# Patient Record
Sex: MALE | Race: WHITE | NOT HISPANIC OR LATINO
[De-identification: names, ages, dates, MRNs, and addresses within clinical notes are randomized per-mention and may not be internally consistent; named-entity substitution may affect disease eponyms.]

---

## 2018-02-13 PROBLEM — Z00.00 ENCOUNTER FOR PREVENTIVE HEALTH EXAMINATION: Status: ACTIVE | Noted: 2018-02-13

## 2018-02-13 PROBLEM — M54.2 CERVICAL PAIN: Status: ACTIVE | Noted: 2018-02-13

## 2018-02-13 PROBLEM — M54.5 LUMBAGO: Status: ACTIVE | Noted: 2018-02-13

## 2018-02-15 ENCOUNTER — APPOINTMENT (OUTPATIENT)
Dept: ORTHOPEDIC SURGERY | Facility: CLINIC | Age: 48
End: 2018-02-15
Payer: COMMERCIAL

## 2018-02-15 VITALS
HEIGHT: 73 IN | WEIGHT: 202 LBS | BODY MASS INDEX: 26.77 KG/M2 | DIASTOLIC BLOOD PRESSURE: 78 MMHG | HEART RATE: 65 BPM | SYSTOLIC BLOOD PRESSURE: 120 MMHG

## 2018-02-15 DIAGNOSIS — M54.5 LOW BACK PAIN: ICD-10-CM

## 2018-02-15 DIAGNOSIS — M54.2 CERVICALGIA: ICD-10-CM

## 2018-02-15 DIAGNOSIS — M47.22 OTHER SPONDYLOSIS WITH RADICULOPATHY, CERVICAL REGION: ICD-10-CM

## 2018-02-15 PROCEDURE — 72100 X-RAY EXAM L-S SPINE 2/3 VWS: CPT

## 2018-02-15 PROCEDURE — 72040 X-RAY EXAM NECK SPINE 2-3 VW: CPT

## 2018-02-15 PROCEDURE — 99214 OFFICE O/P EST MOD 30 MIN: CPT

## 2018-02-15 RX ORDER — METHYLPREDNISOLONE 4 MG/1
4 TABLET ORAL
Qty: 1 | Refills: 0 | Status: ACTIVE | COMMUNITY
Start: 2018-02-15 | End: 1900-01-01

## 2022-07-26 ENCOUNTER — OUTPATIENT (OUTPATIENT)
Dept: OUTPATIENT SERVICES | Facility: HOSPITAL | Age: 52
LOS: 1 days | End: 2022-07-26
Payer: COMMERCIAL

## 2022-07-26 VITALS
DIASTOLIC BLOOD PRESSURE: 74 MMHG | OXYGEN SATURATION: 99 % | RESPIRATION RATE: 17 BRPM | HEIGHT: 74 IN | SYSTOLIC BLOOD PRESSURE: 131 MMHG | HEART RATE: 70 BPM | WEIGHT: 186.07 LBS | TEMPERATURE: 98 F

## 2022-07-26 DIAGNOSIS — M75.121 COMPLETE ROTATOR CUFF TEAR OR RUPTURE OF RIGHT SHOULDER, NOT SPECIFIED AS TRAUMATIC: ICD-10-CM

## 2022-07-26 DIAGNOSIS — Z01.818 ENCOUNTER FOR OTHER PREPROCEDURAL EXAMINATION: ICD-10-CM

## 2022-07-26 DIAGNOSIS — M25.511 PAIN IN RIGHT SHOULDER: ICD-10-CM

## 2022-07-26 LAB
A1C WITH ESTIMATED AVERAGE GLUCOSE RESULT: 7.1 % — HIGH (ref 4–5.6)
ALBUMIN SERPL ELPH-MCNC: 3.9 G/DL — SIGNIFICANT CHANGE UP (ref 3.3–5)
ALP SERPL-CCNC: 49 U/L — SIGNIFICANT CHANGE UP (ref 40–120)
ALT FLD-CCNC: 26 U/L — SIGNIFICANT CHANGE UP (ref 12–78)
ANION GAP SERPL CALC-SCNC: 7 MMOL/L — SIGNIFICANT CHANGE UP (ref 5–17)
AST SERPL-CCNC: 18 U/L — SIGNIFICANT CHANGE UP (ref 15–37)
BILIRUB SERPL-MCNC: 0.4 MG/DL — SIGNIFICANT CHANGE UP (ref 0.2–1.2)
BUN SERPL-MCNC: 20 MG/DL — SIGNIFICANT CHANGE UP (ref 7–23)
CALCIUM SERPL-MCNC: 9.1 MG/DL — SIGNIFICANT CHANGE UP (ref 8.5–10.1)
CHLORIDE SERPL-SCNC: 110 MMOL/L — HIGH (ref 96–108)
CO2 SERPL-SCNC: 24 MMOL/L — SIGNIFICANT CHANGE UP (ref 22–31)
CREAT SERPL-MCNC: 0.84 MG/DL — SIGNIFICANT CHANGE UP (ref 0.5–1.3)
EGFR: 105 ML/MIN/1.73M2 — SIGNIFICANT CHANGE UP
ESTIMATED AVERAGE GLUCOSE: 157 MG/DL — HIGH (ref 68–114)
GLUCOSE SERPL-MCNC: 189 MG/DL — HIGH (ref 70–99)
HCT VFR BLD CALC: 43.1 % — SIGNIFICANT CHANGE UP (ref 39–50)
HGB BLD-MCNC: 14.6 G/DL — SIGNIFICANT CHANGE UP (ref 13–17)
MCHC RBC-ENTMCNC: 30.2 PG — SIGNIFICANT CHANGE UP (ref 27–34)
MCHC RBC-ENTMCNC: 33.9 GM/DL — SIGNIFICANT CHANGE UP (ref 32–36)
MCV RBC AUTO: 89 FL — SIGNIFICANT CHANGE UP (ref 80–100)
NRBC # BLD: 0 /100 WBCS — SIGNIFICANT CHANGE UP (ref 0–0)
PLATELET # BLD AUTO: 130 K/UL — LOW (ref 150–400)
POTASSIUM SERPL-MCNC: 3.8 MMOL/L — SIGNIFICANT CHANGE UP (ref 3.5–5.3)
POTASSIUM SERPL-SCNC: 3.8 MMOL/L — SIGNIFICANT CHANGE UP (ref 3.5–5.3)
PROT SERPL-MCNC: 6.6 G/DL — SIGNIFICANT CHANGE UP (ref 6–8.3)
RBC # BLD: 4.84 M/UL — SIGNIFICANT CHANGE UP (ref 4.2–5.8)
RBC # FLD: 13 % — SIGNIFICANT CHANGE UP (ref 10.3–14.5)
SODIUM SERPL-SCNC: 141 MMOL/L — SIGNIFICANT CHANGE UP (ref 135–145)
WBC # BLD: 6.23 K/UL — SIGNIFICANT CHANGE UP (ref 3.8–10.5)
WBC # FLD AUTO: 6.23 K/UL — SIGNIFICANT CHANGE UP (ref 3.8–10.5)

## 2022-07-26 PROCEDURE — 80053 COMPREHEN METABOLIC PANEL: CPT

## 2022-07-26 PROCEDURE — 85027 COMPLETE CBC AUTOMATED: CPT

## 2022-07-26 PROCEDURE — 93010 ELECTROCARDIOGRAM REPORT: CPT

## 2022-07-26 PROCEDURE — 36415 COLL VENOUS BLD VENIPUNCTURE: CPT

## 2022-07-26 PROCEDURE — G0463: CPT

## 2022-07-26 PROCEDURE — 93005 ELECTROCARDIOGRAM TRACING: CPT

## 2022-07-26 PROCEDURE — 83036 HEMOGLOBIN GLYCOSYLATED A1C: CPT

## 2022-07-26 NOTE — H&P PST ADULT - PROBLEM SELECTOR PLAN 1
PST: CBC,CMP,EKG,A1c  All preoperative instructions including CHD cleanse reviewed with patient who verbalized understanding.   Medical evaluation with Dr. Vargas  All preoperative instructions including CHD cleanse reviewed with patient who verbalized understanding.  Avoid all NSAID/ASA containing products as well as all herbal/vitamin supplements. Tylenol only for pain/headache.   Covid swab at [place] date TBD. Pt verbalized understanding.   Fully vaccinated; Denies recent travel, recent illness and sick contact with COVID in the last 3 weeks A1c pending. Would need endo clearance if a1c comes back 9 or above. All Diabetes medication reviewed with patient. Fingerstick am of surgery. Verbalized understanding

## 2022-07-26 NOTE — H&P PST ADULT - NSANTHOSAYNRD_GEN_A_CORE
No. LC screening performed.  STOP BANG Legend: 0-2 = LOW Risk; 3-4 = INTERMEDIATE Risk; 5-8 = HIGH Risk

## 2022-07-26 NOTE — H&P PST ADULT - ASSESSMENT
This 53 yo f with a PMHX of DM,  presents to PST for a scheduled right shoulder arthroscopy rotator cuff repair  with Dr Wade on  8/5/22    This 53 yo m with a PMHX of DM,  presents to PST for a scheduled right shoulder arthroscopy rotator cuff repair  with Dr Wade on  8/5/22

## 2022-07-26 NOTE — H&P PST ADULT - NSICDXFAMILYHX_GEN_ALL_CORE_FT
FAMILY HISTORY:  Father  Still living? Yes, Estimated age: Age Unknown  FH: colon cancer, Age at diagnosis: Age Unknown    Mother  Still living? Yes, Estimated age: Age Unknown  FH: diabetes mellitus, Age at diagnosis: Age Unknown  FH: HTN (hypertension), Age at diagnosis: Age Unknown

## 2022-07-26 NOTE — H&P PST ADULT - FALL HARM RISK - UNIVERSAL INTERVENTIONS
Bed in lowest position, wheels locked, appropriate side rails in place/Call bell, personal items and telephone in reach/Instruct patient to call for assistance before getting out of bed or chair/Non-slip footwear when patient is out of bed/[place] to call system/Physically safe environment - no spills, clutter or unnecessary equipment/Purposeful Proactive Rounding/Room/bathroom lighting operational, light cord in reach

## 2022-07-26 NOTE — H&P PST ADULT - HISTORY OF PRESENT ILLNESS
This 53 yo M with a PMHX of DM,  presents to [facility] for a scheduled right shoulder arthroscopy rotator cuff repair  with Dr Wade on  8/5/22 Pt is electing to have this procedure. Pt was injured while working on 5/16/22

## 2022-08-02 PROBLEM — Z98.890 OTHER SPECIFIED POSTPROCEDURAL STATES: Chronic | Status: ACTIVE | Noted: 2022-07-26

## 2022-08-02 PROBLEM — E11.9 TYPE 2 DIABETES MELLITUS WITHOUT COMPLICATIONS: Chronic | Status: ACTIVE | Noted: 2022-07-26

## 2022-08-03 ENCOUNTER — OUTPATIENT (OUTPATIENT)
Dept: OUTPATIENT SERVICES | Facility: HOSPITAL | Age: 52
LOS: 1 days | End: 2022-08-03
Payer: COMMERCIAL

## 2022-08-03 DIAGNOSIS — Z20.828 CONTACT WITH AND (SUSPECTED) EXPOSURE TO OTHER VIRAL COMMUNICABLE DISEASES: ICD-10-CM

## 2022-08-03 LAB — SARS-COV-2 RNA SPEC QL NAA+PROBE: SIGNIFICANT CHANGE UP

## 2022-08-03 PROCEDURE — U0005: CPT

## 2022-08-03 PROCEDURE — U0003: CPT

## 2022-08-04 ENCOUNTER — TRANSCRIPTION ENCOUNTER (OUTPATIENT)
Age: 52
End: 2022-08-04

## 2022-08-04 NOTE — ASU PATIENT PROFILE, ADULT - FALL HARM RISK - UNIVERSAL INTERVENTIONS
Bed in lowest position, wheels locked, appropriate side rails in place/Call bell, personal items and telephone in reach/Instruct patient to call for assistance before getting out of bed or chair/Non-slip footwear when patient is out of bed/[place] to call system/Physically safe environment - no spills, clutter or unnecessary equipment/Purposeful Proactive Rounding/Room/bathroom lighting operational, light cord in reach

## 2022-08-05 ENCOUNTER — OUTPATIENT (OUTPATIENT)
Dept: OUTPATIENT SERVICES | Facility: HOSPITAL | Age: 52
LOS: 1 days | End: 2022-08-05
Payer: COMMERCIAL

## 2022-08-05 ENCOUNTER — TRANSCRIPTION ENCOUNTER (OUTPATIENT)
Age: 52
End: 2022-08-05

## 2022-08-05 VITALS
RESPIRATION RATE: 14 BRPM | OXYGEN SATURATION: 99 % | DIASTOLIC BLOOD PRESSURE: 64 MMHG | HEART RATE: 72 BPM | SYSTOLIC BLOOD PRESSURE: 114 MMHG

## 2022-08-05 VITALS
HEART RATE: 69 BPM | DIASTOLIC BLOOD PRESSURE: 76 MMHG | WEIGHT: 197.98 LBS | OXYGEN SATURATION: 99 % | HEIGHT: 74 IN | RESPIRATION RATE: 16 BRPM | SYSTOLIC BLOOD PRESSURE: 111 MMHG | TEMPERATURE: 98 F

## 2022-08-05 DIAGNOSIS — M75.121 COMPLETE ROTATOR CUFF TEAR OR RUPTURE OF RIGHT SHOULDER, NOT SPECIFIED AS TRAUMATIC: ICD-10-CM

## 2022-08-05 DIAGNOSIS — M25.511 PAIN IN RIGHT SHOULDER: ICD-10-CM

## 2022-08-05 DIAGNOSIS — Z01.818 ENCOUNTER FOR OTHER PREPROCEDURAL EXAMINATION: ICD-10-CM

## 2022-08-05 DEVICE — SUT ANCHOR FIBERTAK DBL LOAD TAPE BL/W BLK/W 1.3MM: Type: IMPLANTABLE DEVICE | Site: RIGHT | Status: FUNCTIONAL

## 2022-08-05 DEVICE — ARTHREX FIBERTAK BICEPS IMPLANT SYSTEM: Type: IMPLANTABLE DEVICE | Site: RIGHT | Status: FUNCTIONAL

## 2022-08-05 RX ORDER — MORPHINE SULFATE 50 MG/1
10 CAPSULE, EXTENDED RELEASE ORAL ONCE
Refills: 0 | Status: DISCONTINUED | OUTPATIENT
Start: 2022-08-05 | End: 2022-08-05

## 2022-08-05 RX ORDER — SODIUM CHLORIDE 9 MG/ML
1000 INJECTION, SOLUTION INTRAVENOUS
Refills: 0 | Status: DISCONTINUED | OUTPATIENT
Start: 2022-08-05 | End: 2022-08-05

## 2022-08-05 RX ORDER — MORPHINE SULFATE 50 MG/1
4 CAPSULE, EXTENDED RELEASE ORAL ONCE
Refills: 0 | Status: DISCONTINUED | OUTPATIENT
Start: 2022-08-05 | End: 2022-08-05

## 2022-08-05 RX ORDER — HYDROMORPHONE HYDROCHLORIDE 2 MG/ML
0.5 INJECTION INTRAMUSCULAR; INTRAVENOUS; SUBCUTANEOUS
Refills: 0 | Status: DISCONTINUED | OUTPATIENT
Start: 2022-08-05 | End: 2022-08-05

## 2022-08-05 RX ORDER — OXYCODONE HYDROCHLORIDE 5 MG/1
5 TABLET ORAL ONCE
Refills: 0 | Status: DISCONTINUED | OUTPATIENT
Start: 2022-08-05 | End: 2022-08-05

## 2022-08-05 RX ORDER — OMEGA-3 ACID ETHYL ESTERS 1 G
1 CAPSULE ORAL
Qty: 0 | Refills: 0 | DISCHARGE

## 2022-08-05 RX ORDER — CHOLECALCIFEROL (VITAMIN D3) 125 MCG
1 CAPSULE ORAL
Qty: 0 | Refills: 0 | DISCHARGE

## 2022-08-05 RX ORDER — METFORMIN HYDROCHLORIDE 850 MG/1
0 TABLET ORAL
Qty: 0 | Refills: 2 | DISCHARGE

## 2022-08-05 RX ORDER — CEFAZOLIN SODIUM 1 G
2000 VIAL (EA) INJECTION ONCE
Refills: 0 | Status: COMPLETED | OUTPATIENT
Start: 2022-08-05 | End: 2022-08-05

## 2022-08-05 RX ORDER — ONDANSETRON 8 MG/1
1 TABLET, FILM COATED ORAL
Qty: 18 | Refills: 0
Start: 2022-08-05 | End: 2022-08-07

## 2022-08-05 RX ORDER — OXYCODONE HYDROCHLORIDE 5 MG/1
1 TABLET ORAL
Qty: 15 | Refills: 0
Start: 2022-08-05

## 2022-08-05 RX ORDER — OXYCODONE HYDROCHLORIDE 5 MG/1
1 TABLET ORAL
Qty: 20 | Refills: 0
Start: 2022-08-05 | End: 2022-08-09

## 2022-08-05 RX ORDER — ONDANSETRON 8 MG/1
1 TABLET, FILM COATED ORAL
Qty: 30 | Refills: 0
Start: 2022-08-05 | End: 2022-08-14

## 2022-08-05 RX ORDER — DOCUSATE SODIUM 100 MG
1 CAPSULE ORAL
Qty: 21 | Refills: 0
Start: 2022-08-05 | End: 2022-08-11

## 2022-08-05 RX ORDER — ROSUVASTATIN CALCIUM 5 MG/1
0 TABLET ORAL
Qty: 0 | Refills: 2 | DISCHARGE

## 2022-08-05 RX ORDER — ONDANSETRON 8 MG/1
4 TABLET, FILM COATED ORAL ONCE
Refills: 0 | Status: COMPLETED | OUTPATIENT
Start: 2022-08-05 | End: 2022-08-05

## 2022-08-05 RX ORDER — DOCUSATE SODIUM 100 MG
1 CAPSULE ORAL
Qty: 15 | Refills: 0
Start: 2022-08-05 | End: 2022-08-09

## 2022-08-05 RX ADMIN — SODIUM CHLORIDE 75 MILLILITER(S): 9 INJECTION, SOLUTION INTRAVENOUS at 16:43

## 2022-08-05 RX ADMIN — SODIUM CHLORIDE 50 MILLILITER(S): 9 INJECTION, SOLUTION INTRAVENOUS at 10:38

## 2022-08-05 RX ADMIN — ONDANSETRON 4 MILLIGRAM(S): 8 TABLET, FILM COATED ORAL at 17:49

## 2022-08-05 NOTE — ASU DISCHARGE PLAN (ADULT/PEDIATRIC) - ASU DC SPECIAL INSTRUCTIONSFT
Shoulder Arthroscopy Instructions    1) PAIN & SWELLING: Your shoulder may swell over the next 48-72 hours and pain may get a bit worse. Ice it plenty, continuously if possible. Fill up a plastic bag, then wrap it in a towel or pillow case and lay it on your shoulder.     2) ACTIVITY: Elevate your hand as much you can, and wiggle your fingers as often. Otherwise you should be up and about, walking as much as you can tolerate. The more you move the better you will do. No weight bearing on the arm yet until instructed to in the office. May remove sling to straighten your elbow a few times a day. Please do this to keep elbow from getting stiff.     3) BANDAGE: Expect some drainage onto the bandage. It is normal. It may even soak through and look bloody. This is mostly leftover Saline fluid coming out from surgery. Even a drop of blood can make it look very bloody. Just place another Gauze over it. If it bleeds through the second bandage again, call.    4) SHOWER: Remove bandage in 72hrs and place waterproof band aides. You can shower in 72 hours. No bath, no pool, no hot tub. Pat your incisions dry. No creams, no lotions.    5) ISSUES: Only reason to worry would be if pain got so severe that you cannot feel or wiggle your fingers. In this case you need to call or come to the ER. But as long as you can feel or wiggle your fingers, you are fine.    6) FOLLOW UP: Call Dr. Wade's office to schedule a follow up appointment to be seen in 10-14 days. Your sutures (if applicable) will be removed at that point.    7) PAIN MEDICATION: A pain Rx was sent electronically to your pharmacy, pick it up on the way home.    8) HOME MEDICATIONS: resume your home medications as you normally do.

## 2022-08-06 ENCOUNTER — RESULT REVIEW (OUTPATIENT)
Age: 52
End: 2022-08-06

## 2022-08-06 PROCEDURE — 88304 TISSUE EXAM BY PATHOLOGIST: CPT

## 2022-08-06 PROCEDURE — 29827 SHO ARTHRS SRG RT8TR CUF RPR: CPT | Mod: RT

## 2022-08-06 PROCEDURE — 29826 SHO ARTHRS SRG DECOMPRESSION: CPT | Mod: RT

## 2022-08-06 PROCEDURE — 29823 SHO ARTHRS SRG XTNSV DBRDMT: CPT | Mod: RT

## 2022-08-06 PROCEDURE — C1713: CPT

## 2022-08-06 PROCEDURE — 82962 GLUCOSE BLOOD TEST: CPT

## 2022-08-06 PROCEDURE — 88304 TISSUE EXAM BY PATHOLOGIST: CPT | Mod: 26

## 2023-02-24 NOTE — H&P PST ADULT - RESPIRATORY RATE (BREATHS/MIN)
[de-identified] : Presents  with a  c/o contipation ( predominately) alternating with diarrhea. Notes increased stress secondary to daughter with lymphoma / mother passing from cholangiocarcinoma. Admits to  "esophageal spasms".  Denies  nausea, vomiting, fever, chills, melena, hematemesis, BRBPR, dysphagia\par \par Last evaluated 9/2019 with c/o intermittent heartburn for the past several months  ( off PPI at that time). Additionally c/o greater than  6 months of watery diarrhea ( occasionally post prandial / occasionally nocturnal) alternating with constipation. Denied recent travel / antibiotics / sick contact.  Labs were notable  for elevated ESR / CRP / ALT = 49 with normal TB . AST / ALK = 156..sonogram ordered and stool studies ordered were negative\par \par LFTS were normal 1/2023 as was CRP.  Recent ESR was also normal\par \par -Colonoscopy 10/2019: hemorrhoids / diverticulosis / normal random biopsies\par \par  Evaluated  3/2018 at which point an EGD was performed  secondary to heartburn. Path was c/w GERD.   The patient is s/p gastric bypass ( 2007) .\par  EGD / colonoscopy in 12/2015 revealed gastritis in the gastric pouch / diverticulosis and internal hemorrhoids
17

## 2023-10-30 ENCOUNTER — APPOINTMENT (OUTPATIENT)
Dept: ORTHOPEDIC SURGERY | Facility: CLINIC | Age: 53
End: 2023-10-30
Payer: OTHER MISCELLANEOUS

## 2023-10-30 VITALS
SYSTOLIC BLOOD PRESSURE: 122 MMHG | HEART RATE: 74 BPM | HEIGHT: 73 IN | BODY MASS INDEX: 26.77 KG/M2 | WEIGHT: 202 LBS | DIASTOLIC BLOOD PRESSURE: 78 MMHG

## 2023-10-30 DIAGNOSIS — M25.561 PAIN IN RIGHT KNEE: ICD-10-CM

## 2023-10-30 DIAGNOSIS — M25.562 PAIN IN RIGHT KNEE: ICD-10-CM

## 2023-10-30 PROCEDURE — 73564 X-RAY EXAM KNEE 4 OR MORE: CPT | Mod: 50

## 2023-10-30 PROCEDURE — 20610 DRAIN/INJ JOINT/BURSA W/O US: CPT | Mod: LT

## 2023-10-30 PROCEDURE — 99204 OFFICE O/P NEW MOD 45 MIN: CPT | Mod: 25

## 2023-10-30 RX ORDER — MELOXICAM 15 MG/1
15 TABLET ORAL
Qty: 30 | Refills: 1 | Status: ACTIVE | COMMUNITY
Start: 2023-10-30 | End: 1900-01-01

## 2023-12-11 ENCOUNTER — APPOINTMENT (OUTPATIENT)
Dept: ORTHOPEDIC SURGERY | Facility: CLINIC | Age: 53
End: 2023-12-11

## 2025-02-20 ENCOUNTER — OFFICE (OUTPATIENT)
Dept: URBAN - METROPOLITAN AREA CLINIC 104 | Facility: CLINIC | Age: 55
Setting detail: OPHTHALMOLOGY
End: 2025-02-20
Payer: COMMERCIAL

## 2025-02-20 DIAGNOSIS — H43.393: ICD-10-CM

## 2025-02-20 DIAGNOSIS — H25.13: ICD-10-CM

## 2025-02-20 DIAGNOSIS — H43.391: ICD-10-CM

## 2025-02-20 PROCEDURE — 92014 COMPRE OPH EXAM EST PT 1/>: CPT | Performed by: OPTOMETRIST

## 2025-02-20 ASSESSMENT — REFRACTION_AUTOREFRACTION
OS_SPHERE: +1.25
OD_CYLINDER: -0.50
OS_AXIS: 102
OS_CYLINDER: -0.50
OD_AXIS: 090
OD_SPHERE: +1.50

## 2025-02-20 ASSESSMENT — KERATOMETRY
OS_K1POWER_DIOPTERS: 41.62
OD_K1POWER_DIOPTERS: 41.62
OS_K2POWER_DIOPTERS: 41.87
OD_AXISANGLE_DEGREES: 091
OS_AXISANGLE_DEGREES: 113
OD_K2POWER_DIOPTERS: 41.36

## 2025-02-20 ASSESSMENT — VISUAL ACUITY
OS_BCVA: 20/30
OD_BCVA: 20/25

## 2025-02-20 ASSESSMENT — REFRACTION_CURRENTRX
OD_SPHERE: +1.75
OD_OVR_VA: 20/
OD_AXIS: 001
OD_CYLINDER: 0.00
OS_OVR_VA: 20/
OS_CYLINDER: -0.50
OS_AXIS: 061
OS_SPHERE: +2.75

## 2025-02-20 ASSESSMENT — CONFRONTATIONAL VISUAL FIELD TEST (CVF)
OD_FINDINGS: FULL
OS_FINDINGS: FULL

## (undated) DEVICE — SYR LUER LOK 50CC

## (undated) DEVICE — POSITIONER S&N FACE MASK SPIDER 2

## (undated) DEVICE — SUT POLYSORB 0 30" GS-21 UNDYED

## (undated) DEVICE — SOL IRR BAG NS 0.9% 3000ML

## (undated) DEVICE — SLING SHOULDER ULTRASLING LARGE

## (undated) DEVICE — ELCTR WAND COVATOR 20

## (undated) DEVICE — NDL HYPO SAFE 22G X 1.5" (BLACK)

## (undated) DEVICE — PLV-LINVATEC ARTHROSCOPIC TRAY: Type: DURABLE MEDICAL EQUIPMENT

## (undated) DEVICE — GLV 8.5 PROTEXIS (WHITE)

## (undated) DEVICE — DRAPE TOWEL BLUE 17" X 24"

## (undated) DEVICE — DRSG KLING 2"

## (undated) DEVICE — SUT MONOSOF 3-0 18" P-12

## (undated) DEVICE — DRSG TEGADERM 4X4.75"

## (undated) DEVICE — PACK SHOULDER ARTHROSCOPY

## (undated) DEVICE — NDL SPINAL 18G X 3.5" (PINK)

## (undated) DEVICE — SUT SURGIPRO 3-0 18" P-12

## (undated) DEVICE — VENODYNE/SCD SLEEVE CALF LARGE

## (undated) DEVICE — SHAVER BLADE GREAT WHITE 4.2MM

## (undated) DEVICE — S&N ARTHROCARE WAND TURBOVAC 90 DEGREE

## (undated) DEVICE — VENODYNE/SCD SLEEVE CALF MEDIUM

## (undated) DEVICE — WARMING BLANKET LOWER ADULT

## (undated) DEVICE — CANNULA LINVATEC SHOULDER 7CM (GREY & ORANGE)

## (undated) DEVICE — SHAVER BLADE LINVATEC FULL RADIUS RESECTOR 3.5MM

## (undated) DEVICE — SUT NDL MAYO CATGUT 1/2 CIRCLE TAPER POINT 0.050" X 1.092"

## (undated) DEVICE — ARTHREX MULTIFIRE SCORPION NEEDLE

## (undated) DEVICE — BUR LINVATEC VORTEX 4.5 GREEN

## (undated) DEVICE — PLV/PSP-ESU FORCEFX F8I7418A: Type: DURABLE MEDICAL EQUIPMENT

## (undated) DEVICE — GLV 8.5 PROTEXIS ORTHO (CREAM)

## (undated) DEVICE — SUT FIBERWIRE #2 38" STRAND 1 BLUE 1 WHITE/BLACK

## (undated) DEVICE — DRAPE U POLY BLUE 60"X60"

## (undated) DEVICE — FLOORMAT SURGISAFE ABSORBANT WHITE 36" X 72"

## (undated) DEVICE — DRSG STOCKINETTE IMPERVIOUS LG

## (undated) DEVICE — TUBING DEPUY MITEK FMS INFLOW

## (undated) DEVICE — SUT MONOSOF 4-0 18" P-12

## (undated) DEVICE — DRAPE 3/4 SHEET W REINFORCEMENT 56X77"

## (undated) DEVICE — LAP PAD W RING 18 X 18"

## (undated) DEVICE — ARTHREX KIT FOR 2.6 FIBERTAK ANCHORS

## (undated) DEVICE — PLV-SCD MACHINE: Type: DURABLE MEDICAL EQUIPMENT

## (undated) DEVICE — DRAPE IOBAN 23" X 23"

## (undated) DEVICE — TUBING DEPUY MITEK FMS OUTFLOW

## (undated) DEVICE — SYR LUER LOK 10CC

## (undated) DEVICE — SUT POLYSORB 2-0 30" C-14 UNDYED

## (undated) DEVICE — S&N ARTHROCARE WAND COBLATION WEREWOLF FLOW 90